# Patient Record
Sex: MALE | Race: BLACK OR AFRICAN AMERICAN | NOT HISPANIC OR LATINO | Employment: FULL TIME | URBAN - METROPOLITAN AREA
[De-identification: names, ages, dates, MRNs, and addresses within clinical notes are randomized per-mention and may not be internally consistent; named-entity substitution may affect disease eponyms.]

---

## 2024-02-17 PROBLEM — R03.0 ELEVATED BLOOD PRESSURE READING IN OFFICE WITHOUT DIAGNOSIS OF HYPERTENSION: Status: ACTIVE | Noted: 2024-02-17

## 2024-02-17 PROBLEM — B34.9 VIRAL ILLNESS: Status: ACTIVE | Noted: 2024-02-17

## 2024-08-19 ENCOUNTER — OFFICE VISIT (OUTPATIENT)
Dept: UROLOGY | Facility: CLINIC | Age: 44
End: 2024-08-19
Payer: COMMERCIAL

## 2024-08-19 VITALS
DIASTOLIC BLOOD PRESSURE: 84 MMHG | WEIGHT: 272.19 LBS | BODY MASS INDEX: 33.84 KG/M2 | SYSTOLIC BLOOD PRESSURE: 135 MMHG | HEART RATE: 85 BPM | HEIGHT: 75 IN

## 2024-08-19 DIAGNOSIS — A63.0 GENITAL WARTS: ICD-10-CM

## 2024-08-19 DIAGNOSIS — Z41.2 ENCOUNTER FOR CIRCUMCISION: Primary | ICD-10-CM

## 2024-08-19 PROCEDURE — 1159F MED LIST DOCD IN RCRD: CPT | Mod: CPTII,S$GLB,, | Performed by: NURSE PRACTITIONER

## 2024-08-19 PROCEDURE — 3008F BODY MASS INDEX DOCD: CPT | Mod: CPTII,S$GLB,, | Performed by: NURSE PRACTITIONER

## 2024-08-19 PROCEDURE — 99999 PR PBB SHADOW E&M-EST. PATIENT-LVL III: CPT | Mod: PBBFAC,,, | Performed by: NURSE PRACTITIONER

## 2024-08-19 PROCEDURE — 87491 CHLMYD TRACH DNA AMP PROBE: CPT | Performed by: NURSE PRACTITIONER

## 2024-08-19 PROCEDURE — 3075F SYST BP GE 130 - 139MM HG: CPT | Mod: CPTII,S$GLB,, | Performed by: NURSE PRACTITIONER

## 2024-08-19 PROCEDURE — 1160F RVW MEDS BY RX/DR IN RCRD: CPT | Mod: CPTII,S$GLB,, | Performed by: NURSE PRACTITIONER

## 2024-08-19 PROCEDURE — 3079F DIAST BP 80-89 MM HG: CPT | Mod: CPTII,S$GLB,, | Performed by: NURSE PRACTITIONER

## 2024-08-19 PROCEDURE — 87086 URINE CULTURE/COLONY COUNT: CPT | Performed by: NURSE PRACTITIONER

## 2024-08-19 PROCEDURE — 99204 OFFICE O/P NEW MOD 45 MIN: CPT | Mod: S$GLB,,, | Performed by: NURSE PRACTITIONER

## 2024-08-19 PROCEDURE — 87591 N.GONORRHOEAE DNA AMP PROB: CPT | Performed by: NURSE PRACTITIONER

## 2024-08-19 NOTE — PROGRESS NOTES
"Subjective:      Jodee Song is a 44 y.o. male who presents for evaluation of cicumcision.      Presents today for circumcision consult.  He reports that his partner is having recurring yeast and BV infections.  He denies history of phimosis/paraphimosis.  He denies history of balanitis.  He was seen for these same complaints by Dr. Knxo in 2016  He recently had 2 warts removed by dermatology; reports that he has 2 other lesions on the other side of his penis that he is concerned about.    The following portions of the patient's history were reviewed and updated as appropriate: allergies, current medications, past family history, past medical history, past social history, past surgical history and problem list.    Review of Systems  Constitutional: no fever or chills  ENT: no nasal congestion or sore throat  Respiratory: no cough or shortness of breath  Cardiovascular: no chest pain or palpitations  Gastrointestinal: no nausea or vomiting, tolerating diet  Genitourinary: as per HPI  Hematologic/Lymphatic: no easy bruising or lymphadenopathy  Musculoskeletal: no arthralgias or myalgias  Neurological: no seizures or tremors  Behavioral/Psych: no auditory or visual hallucinations     Objective:   Vitals: /84 (BP Location: Left arm, Patient Position: Sitting, BP Method: Large (Automatic))   Pulse 85   Ht 6' 3" (1.905 m)   Wt 123.5 kg (272 lb 2.5 oz)   BMI 34.02 kg/m²     Physical Exam   General: alert and oriented, no acute distress  Head: normocephalic, atraumatic  Neck: supple, no lymphadenopathy, normal ROM, no masses  Respiratory: Symmetric expansion, non-labored breathing  Genitourinary:   Penis: uncircumcised:  Foreskin easily reducible and retractable.  No evidence of scarring.  No evidence of balanitis/phimosis. + warts; 2  lesions noted  posterior penis near glans   Scrotum: no rashes or skin changes;   Skin: normal coloration and turgor, no rashes, no suspicious skin lesions noted  Neuro: " alert and oriented x3, no gross deficits  Psych: normal judgment and insight, normal mood/affect, and non-anxious    Physical Exam    Lab Review   Urinalysis demonstrates no ua   Lab Results   Component Value Date    WBC 12.08 07/05/2024    HGB 14.4 07/05/2024    HCT 42.8 07/05/2024    MCV 84 07/05/2024     07/05/2024     Lab Results   Component Value Date    CREATININE 1.6 (H) 07/05/2024    BUN 11 07/05/2024       Assessment and Plan:   1. Encounter for circumcision  --no evidence of phimosis/balanitis on exam.  Discussed with patient and his wife while on phone that there is no indication to proceed with elective circumcision unless patient explicitly request.  Recommend she follow-up with her OB gyn for further evaluation of the cause of her recurrent genital infections.    - Urine culture  - RPR; Future  - HIV 1/2 Ag/Ab (4th Gen); Future  - C. trachomatis/N. gonorrhoeae by AMP [facility]; Urine  - HERPES SIMPLEX 1&2 IGG; Future    2. Genital warts  --recommend patient return to Dermatology for wart removal     --will notify with results  --rtc prn     This note is dictated on M*Modal word recognition program.  There are word recognition mistakes that are occasionally missed on review.

## 2024-08-20 LAB
C TRACH DNA SPEC QL NAA+PROBE: NOT DETECTED
N GONORRHOEA DNA SPEC QL NAA+PROBE: NOT DETECTED

## 2024-08-21 LAB — BACTERIA UR CULT: NO GROWTH

## 2024-10-30 ENCOUNTER — PATIENT MESSAGE (OUTPATIENT)
Dept: RESEARCH | Facility: HOSPITAL | Age: 44
End: 2024-10-30
Payer: COMMERCIAL

## 2024-11-25 ENCOUNTER — OFFICE VISIT (OUTPATIENT)
Dept: ENDOCRINOLOGY | Facility: CLINIC | Age: 44
End: 2024-11-25
Payer: COMMERCIAL

## 2024-11-25 VITALS
HEART RATE: 96 BPM | SYSTOLIC BLOOD PRESSURE: 126 MMHG | WEIGHT: 238.81 LBS | DIASTOLIC BLOOD PRESSURE: 90 MMHG | BODY MASS INDEX: 29.85 KG/M2

## 2024-11-25 DIAGNOSIS — R73.03 PREDIABETES: ICD-10-CM

## 2024-11-25 DIAGNOSIS — E27.8 ADRENAL MASS, RIGHT: Primary | ICD-10-CM

## 2024-11-25 DIAGNOSIS — Z72.0 TOBACCO USE: ICD-10-CM

## 2024-11-25 PROCEDURE — 3080F DIAST BP >= 90 MM HG: CPT | Mod: CPTII,S$GLB,, | Performed by: HOSPITALIST

## 2024-11-25 PROCEDURE — 3074F SYST BP LT 130 MM HG: CPT | Mod: CPTII,S$GLB,, | Performed by: HOSPITALIST

## 2024-11-25 PROCEDURE — 3008F BODY MASS INDEX DOCD: CPT | Mod: CPTII,S$GLB,, | Performed by: HOSPITALIST

## 2024-11-25 PROCEDURE — 99999 PR PBB SHADOW E&M-EST. PATIENT-LVL III: CPT | Mod: PBBFAC,,, | Performed by: HOSPITALIST

## 2024-11-25 PROCEDURE — 1159F MED LIST DOCD IN RCRD: CPT | Mod: CPTII,S$GLB,, | Performed by: HOSPITALIST

## 2024-11-25 PROCEDURE — 99204 OFFICE O/P NEW MOD 45 MIN: CPT | Mod: S$GLB,,, | Performed by: HOSPITALIST

## 2024-11-25 RX ORDER — DEXAMETHASONE 1 MG/1
1 TABLET ORAL ONCE
Qty: 1 TABLET | Refills: 0 | Status: SHIPPED | OUTPATIENT
Start: 2024-11-25 | End: 2024-11-25

## 2024-11-25 NOTE — PROGRESS NOTES
"Subjective:      Patient ID: Jodee Song is a 44 y.o. male presented to [facility] today.  Chief complaint:  adrenal mass      History of Present illness: Jodee Song is a 44 y.o. male here for  adrenal incidentaloma  Other significant past medical history:  Prediabetes  Current PCP Ty Glez MD      Interval history: Sent by NP for evaluation of right adrenal mass incidental finding on CTA 7/5/2024   No past medical history.  Does not taking medication.  Does have history of prediabetes.  Not on medication  No herbal supplements.  No over-the-counter vitamin.      Adrenal incidentaloma:  Right adrenal mass  - Patient was found to have R adrenal nodule on recent imaging: on CTA abdomen 7/5/2024 incidental finding   - Previous history of adrenal mass: no  - History of malignancy/cancer diagnosis: no  - Tobacco user: yes, still smoking  - Abdominal discomfort: no  - Last imaging:  Images reviewed: CTA 7/5/2024: The adrenal glands both demonstrate diffuse thickening, possibly related to adrenal cortical hypertrophy. However, in addition, there is a faintly hyperenhancing 9 mm right adrenal nodule       History includes:  No   Yes  [x]    []  Hypertension  [x]    []  Hx of hypertensive emergency  []    [x]  Prediabetes:  A1c 5.8 2/19/2024  [x]    []  Obesity   [x]    []  Hx of Malignancy/cancer diagnosis  [x]    []  Steroid use/exposure (PO/IM)  [x]    []  Opioid/Rec drug use  [x]    []  Hx of Bariatric surgery/gastric surgery  [x]    []  Migraine/Headache/Neuro issue  [x]    []  Psychiatric medication  use      No results found for: "METANEPHRINE", "NORMETANEPHR", "LABCORT", "ACTH", "DHEASO4"  No results found for: "ALDOSTERONE", "RENIN", "LABRENI", "ALDRENINCALC"    No results found for: "DOPAMINEPLAS", "LABEPIN", "LABNORE", "CATECHOLAMIN"  No results found for: "UTOTVOL", "UCD", "METANEPHRINE", "NORMETANEPHR", "BJDPDQAV19RL", "SMRBLY63BMP", "IMZPOME36UQC", " ""[id number]"    Weight Trend  Wt Readings from Last 3 Encounters:   11/25/24 1417 108.3 kg (238 lb 12.8 oz)   08/19/24 1041 123.5 kg (272 lb 2.5 oz)   07/04/24 2212 119.7 kg (264 lb)       The patient's medications, allergies, past medical, surgical, social and family histories were reviewed and updated as appropriate.  Review of Systems: pertinent positives as per the above HPI, and otherwise negative.    Objective:   BP (!) 126/90   Pulse 96   Wt 108.3 kg (238 lb 12.8 oz)   BMI 29.85 kg/m²   Body mass index is 29.85 kg/m².  Vital signs reviewed    Physical Exam  Vitals and nursing note reviewed.   Constitutional:       Appearance: Normal appearance. He is well-developed. He is obese. He is not ill-appearing.   Neck:      Thyroid: No thyromegaly.   Pulmonary:      Effort: Pulmonary effort is normal. No respiratory distress.   Musculoskeletal:         General: Normal range of motion.      Cervical back: Normal range of motion.   Neurological:      General: No focal deficit present.      Mental Status: He is alert. Mental status is at baseline.   Psychiatric:         Mood and Affect: Mood normal.         Behavior: Behavior normal.       Labs reviewed:  See results in subjective  No results found for: "HGBA1C"  No results found for: "CHOL", "HDL", "LDLCALC", "TRIG", "CHOLHDL"  Lab Results   Component Value Date     07/05/2024    K 4.5 07/05/2024     07/05/2024    CO2 26 07/05/2024    GLU 98 07/05/2024    BUN 11 07/05/2024    CREATININE 1.6 (H) 07/05/2024    CALCIUM 9.4 07/05/2024    PROT 7.7 07/05/2024    ALBUMIN 3.9 07/05/2024    BILITOT 0.2 07/05/2024    ALKPHOS 79 07/05/2024    AST 10 07/05/2024    ALT 18 07/05/2024    ANIONGAP 14 07/05/2024    EGFRNORACEVR 55.2 (A) 07/05/2024     Assessment     1. Adrenal mass, right  dexAMETHasone (DECADRON) 1 MG Tab    Cortisol, 8AM    ACTH    Dexamethasone    Comprehensive Metabolic Panel    Metanephrines, Plasma Free    DHEA-Sulfate    Renin    Aldosterone    " Hemoglobin A1C      2. Tobacco use        3. Prediabetes           Plan     No problem-specific Assessment & Plan notes found for this encounter.    Adrenal mass, right  - Patient with RIGHT Adrenal incidentaloma noted on CT abdomen, sent here for evaluation  - Reviewed incidence, role of adrenal glands in hormonal control discussed with patient  - Discussed endocrinology role is to evaluate for hormonal excess given adrenal mass  - Indications for surgery are size, concerning characteristics and functionality (pheochromocytoma or cortisol excess).    - Does have history of diabetes, obesity, prediabetes  - Previous Adrenal hormones work up: no    Plan   - Reassurance given to patient and family today, low risk given size  - Pursue hormonal testing to include:  A1c, 1 mg overnight DST/midnight cortisol x2, DHEAS, roberto/renin ratio and plasma metanephrines  (accepting a higher false + rate)  - Check renal function  - Pending testing above, will lead to further workup  - If hormonal workup are negative, low likelihood of conversion to productive adenoma      Tobacco use  - recommend cessation      Prediabetes  - not on medication, A1c 5.8% done at [facility] 2024      Advised patient to follow up with PCP for routine health maintenance care.     Sohan Marshall M.D.  Endocrinology  [facility]  11/25/2024      Disclaimer: This note has been generated in part with the use of voice-recognition software. There may be typographical errors that have been missed during proof-reading.

## 2024-11-25 NOTE — PATIENT INSTRUCTIONS
Thank you for completing the visit with me today.    As we discussed today, we will proceed with a hormonal workup to determine if the adrenal mass on your RIGHT side is producing excess hormones.    For this, we will need you to undergo fasting lab work at 8:00 a.m. to check your hormone levels. Specifically, to assess your cortisol levels, please follow these instructions:  Take 1 mg of dexamethasone at 11:00 p.m. The prescription has been sent to your pharmacy   Return the following morning at 8:00 a.m. to have your lab work done.    It is crucial that you take the medication at the exact time and get the lab work done at 8:00 a.m. the next day.   The timing is very important for accurate results.    ---------------------------------------------------------------------------    Adrenal Incidentaloma  An adrenal incidentaloma is an adrenal tumor that is discovered on an imaging test that is being done for a problem unrelated to adrenal disease. Adrenal tumors found as part of the work-up or follow-up of cancer are very likely to be adrenal metastases and do not count as adrenal incidentalomas. As imaging techniques have improved and become more commonly used, doctors are finding more and more adrenal incidentalomas.     The chance of having an adrenal incidentaloma increases with increasing age. At the age of 50, there is a 3% risk of having an adrenal incidentaloma and this goes up to 7% by the age of 70.     Adrenal incidentalomas fall into one of three categories:  Benign, non-functional tumors - these tumors include adenomas, myelolipomas, ganglioneuromas, adrenal cysts, and hematomas. Most common  Functioning tumors - these tumors make too much of any of the hormones that the adrenal glands  Malignant tumors - these tumors include adrenocortical cancer and metastatic disease. Very Rare    While most adrenal incidentalomas do not cause health problems, they must be evaluated because a small percentage can lead  to significant disease.  Work-up of Adrenal Incidentaloma  In determining the treatment of adrenal incidentaloma, two questions must be asked: 1) is it functional and 2) is it cancer    Is it functional?  We will check your hormones with blood work  We will consider repeat imaging 1 year year to monitor the size  Repeat lab work (if the initial screening lab work ordered today are normal) in 1 year as well.  Functional adrenal incidentalomas should be removed. As well as very large non-functional tumors should be removed. Otherwise most inactive tumors do not required surgery.    Signs and Symptoms  By definition, adrenal incidentalomas are asymptomatic. However, once an adrenal incidentaloma is discovered, closer questioning and examination may reveal symptoms.    Overall unless needed, we will meet again in 1 year to re-evaluate.    Sohan Marshall M.D  [facility] [address] [facility] [address], [address]  [place] [address]    Office:  [phone]  Fax:  [phone]